# Patient Record
(demographics unavailable — no encounter records)

---

## 2024-10-11 NOTE — PLAN
[FreeTextEntry1] :  66 year old female presents for annual.  -Pap completed, discussed no need after age 65 -Continue seeing PCP -utd on hcm  RTO in 1 yr. Gladys Martin MD

## 2024-10-11 NOTE — PHYSICAL EXAM
[Appropriately responsive] : appropriately responsive [Alert] : alert [No Acute Distress] : no acute distress [Soft] : soft [Non-tender] : non-tender [Non-distended] : non-distended [No HSM] : No HSM [No Lesions] : no lesions [No Mass] : no mass [Oriented x3] : oriented x3 [Examination Of The Breasts] : a normal appearance [No Masses] : no breast masses were palpable [Labia Majora] : normal [Labia Minora] : normal [Normal] : normal [Atrophy] : atrophy [Uterine Adnexae] : absent

## 2024-10-11 NOTE — HISTORY OF PRESENT ILLNESS
[FreeTextEntry1] :  66 year old female presents for annual. Reports doing well and no complaints. States trying to follow up on health with various appointments - sees PCP. Mammo, Colonscopy, and Bone scan UTD. No Hx of abnormal paps. Notes post-menopausal. Works in surgical oncology.  hx of BSO for mayuri tumor 2019

## 2024-10-11 NOTE — END OF VISIT
[FreeTextEntry3] : I, Amina Villa, acted as a scribe on behalf of Dr. Gladys Martin M.D. on 10/11/2024.   All medical entries made by the scribe were at my, Dr. Gladys Martin M.D., direction and personally dictated by me on 10/11/2024. I have reviewed the chart and agree that the record accurately reflects my personal performance of the history, physical exam, assessment and plan. I have also personally directed, reviewed, and agreed with the chart.

## 2024-10-17 NOTE — ASSESSMENT
[FreeTextEntry1] : Patient with recently noted thyroid nodules on ultrasound after abnormal thyroid functions.  Patient with complaints of fatigue and difficulty losing weight.  I recommended repeat blood work today to further assess her thyroid functions and reevaluate possible mild primary hyperparathyroidism.  No suspicious findings on physical examination.  Thyroid ultrasound with multiple left nodules, none meet criteria for biopsy at this time.  I have recommended close monitoring with a repeat ultrasound March 2025.  If nodules enlarge or change in appearance, a biopsy may be indicated.  RTO 6 months. I have answered their questions to the best of my ability.

## 2024-10-17 NOTE — PHYSICAL EXAM
[Normal] : no neck adenopathy [de-identified] : Short thick neck, no cervical or supraclavicular adenopathy, trachea midline, thyroid without enlargement or palpable mass [de-identified] : Skin:  normal appearance.  no rash, nodules, vesicles, or erythema, Musculoskeletal:  full range of motion and no deformities appreciated Neurological:  grossly intact Psychiatric:  oriented to person, place and time with appropriate affect

## 2024-10-17 NOTE — HISTORY OF PRESENT ILLNESS
[de-identified] : Patient referred by Dr. Yousif for evaluation of multinodular goiter.  Patient denies prior history of thyroid disease, dysphagia, change in voice, radiation exposure, reflux or increased thirst.  Patient with history of kidney stones, hypertension and fatigue.  Recent blood work August 2024: TSH 5.6, free T4 1.0, calcium 9.3.  Prior blood work May 2023: Calcium 10.2, PTH 60.  Bone density September 2024: Spine T0.8, hip T- 1.1.  Thyroid ultrasound September 2024: Right lobe 5.4 x 2.2 x 1.9 cm heterogeneous without discrete nodule.  Left lobe 5.1 x 1.7 x 1.7 cm with upper 8 mm TR 2 nodule, mid 1.5 x 1 x 1.1 cm TR 3 nodule and lower 6 mm TR 4 nodule.  No enlarged lymph nodes.  Patient's sister and niece with history of thyroid cancer. I have reviewed all old and new data and available images.

## 2024-10-25 NOTE — REASON FOR VISIT
[Follow-Up Visit] : a follow-up visit for [FreeTextEntry2] : followup bilateral knee pain BUY [employer] BILL GILES KNEES DUROLANE {\} NO AUTH REQUIRED

## 2024-10-25 NOTE — PHYSICAL EXAM
[de-identified] : Constitutional: Well-nourished, well-developed, No acute distress, obese Respiratory:  Good respiratory effort, no SOB Psychiatric: Pleasant and normal affect, alert and oriented x3 Musculoskeletal: normal except where as noted in regional exam   Bilateral Knees: APPEARANCE: + enlargement of the distal femur and proximal tibia, varus deformity, no swelling POSITIVE TENDERNESS:  Distal femur, proximal tibia, medial jt line/retinaculum, and lateral jt line/retinaculum NONTENDER: patellar & quadriceps tendons, MCL/LCL, ITB at the lateral femoral condyle & Gerdy's tubercle, pes bursa. ROM: full extension, limited flexion to 120  degrees due to stiffness and pain. RESISTIVE TESTING: painless resisted knee flex/ext, although + crepitus felt in anterior knee. SPECIAL TESTS: stable v/v stress. painless grind. neg ant/post drawer. + Herbie's for pain in medial and lateral joint line.

## 2024-10-25 NOTE — PROCEDURE
[de-identified] :  Injection: Left   knee joint. Indication: Osteoarthritis.   A discussion was had with the patient regarding this procedure and all questions were answered. All risks, benefits and alternatives were discussed. These included but were not limited to bleeding, infection, and allergic reaction. A timeout was done to ensure correct side and patient agreed to the procedure.  A [place] apoorva was created on the skin utilizing a plastic needle cap to apoorva the anticipated point of entry. Alcohol was used to clean the skin, and Betadine was used to sterilize and prep the area in the lateral joint line aspect of the knee. Ethyl chloride spray was then used as a topical anesthetic. A 20-gauge needle was used to inject 3 cc of Durolane into the knee with ease. A sterile bandage was then applied. The patient tolerated the procedure well and there were no complications.   LOT [id number] EXP: 2027-03-31   ___________   Injection: Right knee joint. Indication: Osteoarthritis.   A discussion was had with the patient regarding this procedure and all questions were answered. All risks, benefits and alternatives were discussed. These included but were not limited to bleeding, infection, and allergic reaction. A timeout was done to ensure correct side and patient agreed to the procedure.  A [place] apoorva was created on the skin utilizing a plastic needle cap to apoorva the anticipated point of entry. Alcohol was used to clean the skin, and Betadine was used to sterilize and prep the area in the lateral joint line aspect of the knee. Ethyl chloride spray was then used as a topical anesthetic. A 20-gauge needle was used to inject 3 cc of Durolane into the knee with ease. A sterile bandage was then applied. The patient tolerated the procedure well and there were no complications.   LOT [id number] EXP: 2027-03-31

## 2024-10-25 NOTE — DISCUSSION/SUMMARY
[de-identified] : Patient was seen today for continued management of chronic knee pain secondary to underlying osteoarthritis. This has been a chronic issue for the patient with recent atraumatic exacerbation. We discussed various treatment options as well as associated risk/benefits/alternatives and patient elected to proceed with hyaluronic acid injection therapy.   A single Durolane injection was given today under sterile conditions into the bilateral knee joints without complication (see procedure note). The patient was instructed on modification of activities over the next 48-72 hours. I advised the patient to ice the knee as needed for control of local irritation from the injection. I advised that some patients have immediate benefit from the initial injection therapy, however it usually takes the medication a number of weeks (~8wks) to provide significant relief of osteoarthritic symptoms. If no significant long-term benefit, the patient may elect for additional treatment strategies as previously discussed. However, if the patient obtains good relief of symptoms, the injection therapy series can be repeated over the next 6-12 months.   Will have the patient followup on an as-needed basis at this time.  Patient and spouse appreciate and agree with current plan.  This note was generated using dragon medical dictation software.  A reasonable effort has been made for proofreading its contents, but typos may still remain.  If there are any questions or points of clarification needed please notify my office.

## 2025-03-31 NOTE — HISTORY OF PRESENT ILLNESS
[FreeTextEntry1] : follow up [de-identified] : 66 yo female with PMHx HTN, prediabetes, HLD, obesity, presents to the office for a follow up on medical issues. reports has been taking olmesartan 5mg QD without any adverse side effects. does not regularly measure BP at home, sometimes measuring at work but admits work stressors and is elevated. denies any headache, dizziness. saw cardio, had ECHO/stress test. recently saw orthopedist, had injections in both knees, currently attending PT twice a week, helpful for knee pain. tolerating zepbound, eating a healthier diet and incorporating more exercise, denies any adverse side effects from zepbound.

## 2025-03-31 NOTE — REVIEW OF SYSTEMS
[Recent Change In Weight] : ~T recent weight change [Joint Pain] : joint pain [Joint Stiffness] : joint stiffness [Joint Swelling] : no joint swelling [Muscle Weakness] : no muscle weakness [Muscle Pain] : muscle pain [Back Pain] : no back pain [Negative] : Psychiatric [FreeTextEntry2] : intentional weight loss [FreeTextEntry9] : bilateral knees

## 2025-04-17 NOTE — PHYSICAL EXAM
[de-identified] : Short thick neck, no cervical or supraclavicular adenopathy, trachea midline, thyroid without enlargement or palpable mass [Normal] : no neck adenopathy [de-identified] : Skin:  normal appearance.  no rash, nodules, vesicles, or erythema, Musculoskeletal:  full range of motion and no deformities appreciated Neurological:  grossly intact Psychiatric:  oriented to person, place and time with appropriate affect

## 2025-04-17 NOTE — HISTORY OF PRESENT ILLNESS
[de-identified] : Patient referred by Dr. Yousif for evaluation of multinodular goiter.  Patient denies prior history of thyroid disease, dysphagia, change in voice, radiation exposure, reflux or increased thirst.  Patient with history of kidney stones, hypertension and fatigue.  Recent blood work August 2024: TSH 5.6, free T4 1.0, calcium 9.3.  Prior blood work May 2023: Calcium 10.2, PTH 60.  Bone density September 2024: Spine T0.8, hip T- 1.1.  Thyroid ultrasound September 2024: Right lobe 5.4 x 2.2 x 1.9 cm heterogeneous without discrete nodule.  Left lobe 5.1 x 1.7 x 1.7 cm with upper 8 mm TR 2 nodule, mid 1.5 x 1 x 1.1 cm TR 3 nodule and lower 6 mm TR 4 nodule.  No enlarged lymph nodes.  Patient's sister and niece with history of thyroid cancer.  Repeat ultrasound April 2025 did not change.  Nodules do not meet criteria for biopsy.  Patient now on weight loss medication injections.  I have reviewed all old and new data and available images.

## 2025-04-17 NOTE — ASSESSMENT
[FreeTextEntry1] : Patient with thyroid nodules on ultrasound after abnormal thyroid functions family history of thyroid cancer.  No suspicious findings on physical examination.  Thyroid ultrasound with multiple left nodules, none meet criteria for biopsy at this time. Nodule stable on repeat biopsy.   I have recommended close monitoring with a repeat ultrasound 9/2025.  If nodules enlarge or change in appearance, a biopsy may be indicated.  RTO 6 months. I have answered their questions to the best of my ability.

## 2025-05-09 NOTE — PROCEDURE
[de-identified] :  Injection: Left   knee joint. Indication: Osteoarthritis.   A discussion was had with the patient regarding this procedure and all questions were answered. All risks, benefits and alternatives were discussed. These included but were not limited to bleeding, infection, and allergic reaction. A timeout was done to ensure correct side and patient agreed to the procedure.  A [place] apoorva was created on the skin utilizing a plastic needle cap to apoorva the anticipated point of entry. Alcohol was used to clean the skin, and Betadine was used to sterilize and prep the area in the lateral joint line aspect of the knee. Ethyl chloride spray was then used as a topical anesthetic. A 20-gauge needle was used to inject 3 cc of Durolane into the knee with ease. A sterile bandage was then applied. The patient tolerated the procedure well and there were no complications.   LOT [id number] EXP: 2027-08-31   ___________   Injection: Right knee joint. Indication: Osteoarthritis.   A discussion was had with the patient regarding this procedure and all questions were answered. All risks, benefits and alternatives were discussed. These included but were not limited to bleeding, infection, and allergic reaction. A timeout was done to ensure correct side and patient agreed to the procedure.  A [place] apoorva was created on the skin utilizing a plastic needle cap to apoorva the anticipated point of entry. Alcohol was used to clean the skin, and Betadine was used to sterilize and prep the area in the lateral joint line aspect of the knee. Ethyl chloride spray was then used as a topical anesthetic. A 20-gauge needle was used to inject 3 cc of Durolane into the knee with ease. A sterile bandage was then applied. The patient tolerated the procedure well and there were no complications.   LOT [id number] EXP: 2027-08-31

## 2025-05-09 NOTE — PHYSICAL EXAM
[de-identified] : Constitutional: Well-nourished, well-developed, No acute distress, obese Respiratory:  Good respiratory effort, no SOB Psychiatric: Pleasant and normal affect, alert and oriented x3 Musculoskeletal: normal except where as noted in regional exam   Bilateral Knees: APPEARANCE: + enlargement of the distal femur and proximal tibia, varus deformity, no swelling POSITIVE TENDERNESS:  Distal femur, proximal tibia, medial jt line/retinaculum, and lateral jt line/retinaculum NONTENDER: patellar & quadriceps tendons, MCL/LCL, ITB at the lateral femoral condyle & Gerdy's tubercle, pes bursa. ROM: full extension, limited flexion to 120  degrees due to stiffness and pain. RESISTIVE TESTING: painless resisted knee flex/ext, although + crepitus felt in anterior knee. SPECIAL TESTS: stable v/v stress. painless grind. neg ant/post drawer. + Herbie's for pain in medial and lateral joint line.

## 2025-05-09 NOTE — HISTORY OF PRESENT ILLNESS
[de-identified] : 67 year old female being seen for a follow-up visit for followup bilateral knee pain BUY & BILL GILES KNEES DUROLANE.  Last injection was Oct 2024 with good relief until a few weeks ago.  Pain has since recurred.  She wishes to proceed with repeat injections today.

## 2025-05-09 NOTE — DISCUSSION/SUMMARY
[de-identified] : Patient was seen today for continued management of chronic knee pain secondary to underlying osteoarthritis. This has been a chronic issue for the patient with recent atraumatic exacerbation. We discussed various treatment options as well as associated risk/benefits/alternatives and patient elected to proceed with hyaluronic acid injection therapy.   A single Durolane injection was given today under sterile conditions into the bilateral knee joints without complication (see procedure note). The patient was instructed on modification of activities over the next 48-72 hours. I advised the patient to ice the knee as needed for control of local irritation from the injection. I advised that some patients have immediate benefit from the initial injection therapy, however it usually takes the medication a number of weeks (~8wks) to provide significant relief of osteoarthritic symptoms. If no significant long-term benefit, the patient may elect for additional treatment strategies as previously discussed. However, if the patient obtains good relief of symptoms, the injection therapy series can be repeated over the next 6-12 months.   Will have the patient followup on an as-needed basis at this time.  Patient appreciates and agrees with current plan.     This note was generated using dragon medical dictation software.  A reasonable effort has been made for proofreading its contents, but typos may still remain.  If there are any questions or points of clarification needed please notify my office.

## 2025-07-07 NOTE — HISTORY OF PRESENT ILLNESS
[FreeTextEntry1] : March 2023 - sigmoid polyp c/w tubluar adenoam, biopsy at 50 cm consistent with ulceration with acute and chronic inflammation negative for dysplasia [de-identified] : April 8, 2025 CT abdomen pelvis with normal liver, gallbladder and pancreas, bowels within normal

## 2025-07-07 NOTE — PHYSICAL EXAM

## 2025-07-07 NOTE — ASSESSMENT
[FreeTextEntry1] : This is a 67-year-old female presenting with symptoms of bright red blood per rectum intermittently ongoing for several weeks.  Last time she saw rectal bleeding was 2 weeks ago.  She also complains of rectal pressure.  Colonoscopy from 2023 with diverticulosis and several tubular adenomas.  She has family history of colon polyps in her brother.  She is concerned.  However there was an area of focal inflammation 30 cm from the anus consistent with acute and chronic inflammation.  Recommend colonoscopy.  She is concerned about family history of stomach cancer.  Upper endoscopy from 2023 with gastritis negative for H. pylori or intestinal metaplasia.  She would like a repeat upper endoscopy.  Risk benefits of both procedures explained.